# Patient Record
Sex: FEMALE | Race: WHITE | NOT HISPANIC OR LATINO | URBAN - METROPOLITAN AREA
[De-identification: names, ages, dates, MRNs, and addresses within clinical notes are randomized per-mention and may not be internally consistent; named-entity substitution may affect disease eponyms.]

---

## 2021-12-01 ENCOUNTER — EMERGENCY (EMERGENCY)
Facility: HOSPITAL | Age: 71
LOS: 1 days | End: 2021-12-01
Admitting: EMERGENCY MEDICINE
Payer: MEDICARE

## 2021-12-01 PROCEDURE — 99284 EMERGENCY DEPT VISIT MOD MDM: CPT

## 2021-12-01 PROCEDURE — 71045 X-RAY EXAM CHEST 1 VIEW: CPT | Mod: 26

## 2021-12-01 PROCEDURE — 93010 ELECTROCARDIOGRAM REPORT: CPT

## 2022-03-21 ENCOUNTER — APPOINTMENT (OUTPATIENT)
Dept: CT IMAGING | Facility: CLINIC | Age: 72
End: 2022-03-21
Payer: MEDICARE

## 2022-03-21 PROCEDURE — 71250 CT THORAX DX C-: CPT

## 2022-04-14 PROBLEM — Z00.00 ENCOUNTER FOR PREVENTIVE HEALTH EXAMINATION: Status: ACTIVE | Noted: 2022-04-14

## 2022-05-09 ENCOUNTER — OUTPATIENT (OUTPATIENT)
Dept: OUTPATIENT SERVICES | Facility: HOSPITAL | Age: 72
LOS: 1 days | End: 2022-05-09
Payer: MEDICARE

## 2022-05-09 DIAGNOSIS — R91.1 SOLITARY PULMONARY NODULE: ICD-10-CM

## 2022-05-16 ENCOUNTER — NON-APPOINTMENT (OUTPATIENT)
Age: 72
End: 2022-05-16

## 2022-05-16 ENCOUNTER — RESULT REVIEW (OUTPATIENT)
Age: 72
End: 2022-05-16

## 2022-05-16 ENCOUNTER — APPOINTMENT (OUTPATIENT)
Dept: HEMATOLOGY ONCOLOGY | Facility: CLINIC | Age: 72
End: 2022-05-16
Payer: MEDICARE

## 2022-05-16 VITALS
WEIGHT: 194.6 LBS | OXYGEN SATURATION: 97 % | HEIGHT: 65 IN | DIASTOLIC BLOOD PRESSURE: 82 MMHG | SYSTOLIC BLOOD PRESSURE: 134 MMHG | BODY MASS INDEX: 32.42 KG/M2 | HEART RATE: 83 BPM | TEMPERATURE: 97.9 F

## 2022-05-16 LAB
ALBUMIN SERPL ELPH-MCNC: 4.6 G/DL
ALP BLD-CCNC: 57 U/L
ALT SERPL-CCNC: 33 U/L
ANION GAP SERPL CALC-SCNC: 17 MMOL/L
APTT BLD: 34.5 SEC
AST SERPL-CCNC: 37 U/L
BASOPHILS # BLD AUTO: 0.1 K/UL — SIGNIFICANT CHANGE UP (ref 0–0.2)
BASOPHILS NFR BLD AUTO: 1 % — SIGNIFICANT CHANGE UP (ref 0–2)
BILIRUB SERPL-MCNC: 0.2 MG/DL
BUN SERPL-MCNC: 17 MG/DL
CALCIUM SERPL-MCNC: 10.4 MG/DL
CEA SERPL-MCNC: 1 NG/ML
CHLORIDE SERPL-SCNC: 103 MMOL/L
CO2 SERPL-SCNC: 19 MMOL/L
CREAT SERPL-MCNC: 0.64 MG/DL
EGFR: 93 ML/MIN/1.73M2
EOSINOPHIL # BLD AUTO: 0.29 K/UL — SIGNIFICANT CHANGE UP (ref 0–0.5)
EOSINOPHIL NFR BLD AUTO: 3 % — SIGNIFICANT CHANGE UP (ref 0–6)
FERRITIN SERPL-MCNC: 61 NG/ML
FOLATE SERPL-MCNC: >20 NG/ML
GLUCOSE SERPL-MCNC: 143 MG/DL
HCT VFR BLD CALC: 38.7 % — SIGNIFICANT CHANGE UP (ref 34.5–45)
HGB BLD-MCNC: 12.6 G/DL — SIGNIFICANT CHANGE UP (ref 11.5–15.5)
IMM GRANULOCYTES NFR BLD AUTO: 0.3 % — SIGNIFICANT CHANGE UP (ref 0–1.5)
INR PPP: 1.08 RATIO
IRON SATN MFR SERPL: 8 %
IRON SERPL-MCNC: 41 UG/DL
LYMPHOCYTES # BLD AUTO: 2.74 K/UL — SIGNIFICANT CHANGE UP (ref 1–3.3)
LYMPHOCYTES # BLD AUTO: 28.2 % — SIGNIFICANT CHANGE UP (ref 13–44)
MCHC RBC-ENTMCNC: 27.7 PG — SIGNIFICANT CHANGE UP (ref 27–34)
MCHC RBC-ENTMCNC: 32.6 GM/DL — SIGNIFICANT CHANGE UP (ref 32–36)
MCV RBC AUTO: 85.1 FL — SIGNIFICANT CHANGE UP (ref 80–100)
MONOCYTES # BLD AUTO: 0.45 K/UL — SIGNIFICANT CHANGE UP (ref 0–0.9)
MONOCYTES NFR BLD AUTO: 4.6 % — SIGNIFICANT CHANGE UP (ref 2–14)
NEUTROPHILS # BLD AUTO: 6.09 K/UL — SIGNIFICANT CHANGE UP (ref 1.8–7.4)
NEUTROPHILS NFR BLD AUTO: 62.9 % — SIGNIFICANT CHANGE UP (ref 43–77)
NRBC # BLD: 0 /100 WBCS — SIGNIFICANT CHANGE UP (ref 0–0)
PLATELET # BLD AUTO: 400 K/UL — SIGNIFICANT CHANGE UP (ref 150–400)
POTASSIUM SERPL-SCNC: 4.5 MMOL/L
PROT SERPL-MCNC: 7.6 G/DL
PT BLD: 12.7 SEC
RBC # BLD: 4.55 M/UL — SIGNIFICANT CHANGE UP (ref 3.8–5.2)
RBC # FLD: 14.2 % — SIGNIFICANT CHANGE UP (ref 10.3–14.5)
SODIUM SERPL-SCNC: 139 MMOL/L
TIBC SERPL-MCNC: 496 UG/DL
UIBC SERPL-MCNC: 455 UG/DL
VIT B12 SERPL-MCNC: 1062 PG/ML
WBC # BLD: 9.7 K/UL — SIGNIFICANT CHANGE UP (ref 3.8–10.5)
WBC # FLD AUTO: 9.7 K/UL — SIGNIFICANT CHANGE UP (ref 3.8–10.5)

## 2022-05-16 PROCEDURE — 85027 COMPLETE CBC AUTOMATED: CPT

## 2022-05-16 PROCEDURE — 36415 COLL VENOUS BLD VENIPUNCTURE: CPT

## 2022-05-16 PROCEDURE — 99205 OFFICE O/P NEW HI 60 MIN: CPT

## 2022-05-16 RX ORDER — FENOFIBRATE 54 MG/1
54 TABLET ORAL DAILY
Refills: 0 | Status: ACTIVE | COMMUNITY

## 2022-05-16 RX ORDER — SIMVASTATIN 20 MG/1
20 TABLET, FILM COATED ORAL DAILY
Refills: 0 | Status: ACTIVE | COMMUNITY

## 2022-05-16 RX ORDER — METFORMIN HYDROCHLORIDE 1000 MG/1
1000 TABLET, COATED ORAL
Refills: 0 | Status: ACTIVE | COMMUNITY

## 2022-05-16 RX ORDER — ASPIRIN 81 MG
81 TABLET, DELAYED RELEASE (ENTERIC COATED) ORAL DAILY
Refills: 0 | Status: ACTIVE | COMMUNITY

## 2022-05-16 RX ORDER — VALSARTAN 160 MG/1
160 TABLET, COATED ORAL DAILY
Refills: 0 | Status: ACTIVE | COMMUNITY

## 2022-05-16 NOTE — CONSULT LETTER
[Dear  ___] : Dear  [unfilled], [Consult Letter:] : I had the pleasure of evaluating your patient, [unfilled]. [Please see my note below.] : Please see my note below. [Consult Closing:] : Thank you very much for allowing me to participate in the care of this patient.  If you have any questions, please do not hesitate to contact me. [Sincerely,] : Sincerely, [FreeTextEntry2] : Dr. Marissa Byrne [FreeTextEntry3] : Dr. Tigist Kelly\par

## 2022-05-16 NOTE — PHYSICAL EXAM
[Restricted in physically strenuous activity but ambulatory and able to carry out work of a light or sedentary nature] : Status 1- Restricted in physically strenuous activity but ambulatory and able to carry out work of a light or sedentary nature, e.g., light house work, office work [Obese] : obese [Normal] : affect appropriate [de-identified] : generally well appearing female, NAD, pleasant  [de-identified] : lungs are clear bilaterally  [de-identified] : s/p bilateral breast reduction and R lumpectomy, post surgical changes, no palpable lymphadenopathy or masses

## 2022-05-16 NOTE — RESULTS/DATA
[FreeTextEntry1] : Ms. Rios presented at age 71 in May 2022 for evaluation of pulmonary nodules. 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 \par \par Will proceed with workup of R apex lesion, follow up PET/CT.

## 2022-05-16 NOTE — HISTORY OF PRESENT ILLNESS
[de-identified] : Referred by: Dr. Marissa Byrne\par Diagnosis: pulmonary nodule\par \par Ms. Rios presented at age 71 in May 2022 for evaluation of pulmonary nodules. 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 \par \par Meagan was sent in for evaluation by her pulmonologist for evaluation of an opacity in the right lung.  She has a history of chronic shortness of breath with exertion. She underwent CT chest 12/2/21 which was negative for PE, revealed groundglass opacity in the R lung apex, as well as adjacent 4mm nodule. Cannot exclude neoplastic process. She then underwent repeat CT chest on 3/21/22 which revealed a 3 x 1.7cm indeterminate opacity within the R apex, scattered subCM nodules, possibly benign intrapulmonary lymph nodes- recommended 3 month follow up.  She is pending a PET/CT at the end of May which was ordered by Dr. Byrne.\par \par Of note she has a history of right breast cancer diagnosed in 2003 and is status post right lumpectomy, radiation and chemotherapy.  She believes her tumor was hormone positive and she completed about 7 years of antiestrogen therapy which she states she took every other day due to issues with her bone density.  She was previously following with medical oncologist Dr. Asencio in [facility] but states she needs a new oncologist since she moved out here about 2 years ago during the pandemic.  She is a never smoker.\par \par At this time she reports that she is generally feeling well and has a good appetite and energy level.  She denies chest pain, fevers, nausea, vomiting, diarrhea, lower extremity swelling.\par \par Imaging: \par CT chest 12/2/21- negative for PE, revealed groundglass opacity in the R lung apex, as well as adjacent 4mm nodule. Cannot exclude neoplastic process. \par CT chest 3/21/22- 3 x 1.7cm indeterminate opacity within the R apex, scattered subCM nodules, possibly benign intrapulmonary lymph nodes- recommended 3 month follow up\par \par Path: none\par \par Genetics: none\par \par HCM: \par - COVID vaccination: s/p 3 doses \par - Colonoscopy: last done 2014, next due 2024\par - Gyn: recently seen by Dr. Doherty, no longer screening with pap \par - Mammo: 4/22/22 BIRADS 2 \par - DEXA 4/22/22 Osteopenia, currently taking vitamin D \par \par SH: \par - Occupation: worked as an  at [employer] in [place] for 47 years\par - Living situation: lives in [place], with her daughter, grandson and nephew\par - Smoking/etoh/illicits: never smoker, rare etoh, denies illicits \par - Exercise: she walks her dog but otherwise is not very active \par \par FH: \par - No family history of cancer

## 2022-05-17 LAB — CANCER AG27-29 SERPL-ACNC: 7.7 U/ML

## 2022-05-25 ENCOUNTER — APPOINTMENT (OUTPATIENT)
Dept: NUCLEAR MEDICINE | Facility: CLINIC | Age: 72
End: 2022-05-25

## 2022-05-25 ENCOUNTER — APPOINTMENT (OUTPATIENT)
Dept: NUCLEAR MEDICINE | Facility: CLINIC | Age: 72
End: 2022-05-25
Payer: MEDICARE

## 2022-05-25 PROCEDURE — A9552: CPT

## 2022-05-25 PROCEDURE — 78815 PET IMAGE W/CT SKULL-THIGH: CPT | Mod: PI

## 2022-06-29 ENCOUNTER — OUTPATIENT (OUTPATIENT)
Dept: OUTPATIENT SERVICES | Facility: HOSPITAL | Age: 72
LOS: 1 days | End: 2022-06-29

## 2022-06-29 ENCOUNTER — APPOINTMENT (OUTPATIENT)
Dept: HEMATOLOGY ONCOLOGY | Facility: CLINIC | Age: 72
End: 2022-06-29

## 2022-06-29 VITALS
HEART RATE: 69 BPM | SYSTOLIC BLOOD PRESSURE: 132 MMHG | DIASTOLIC BLOOD PRESSURE: 80 MMHG | WEIGHT: 194 LBS | BODY MASS INDEX: 32.28 KG/M2 | OXYGEN SATURATION: 97 % | TEMPERATURE: 98.1 F

## 2022-06-29 DIAGNOSIS — Z85.3 PERSONAL HISTORY OF MALIGNANT NEOPLASM OF BREAST: ICD-10-CM

## 2022-06-29 DIAGNOSIS — R91.1 SOLITARY PULMONARY NODULE: ICD-10-CM

## 2022-06-29 DIAGNOSIS — D64.9 ANEMIA, UNSPECIFIED: ICD-10-CM

## 2022-06-29 PROCEDURE — 99214 OFFICE O/P EST MOD 30 MIN: CPT

## 2022-06-29 NOTE — PHYSICAL EXAM
[Restricted in physically strenuous activity but ambulatory and able to carry out work of a light or sedentary nature] : Status 1- Restricted in physically strenuous activity but ambulatory and able to carry out work of a light or sedentary nature, e.g., light house work, office work [Obese] : obese [Normal] : affect appropriate [de-identified] : generally well appearing female, NAD, pleasant  [de-identified] : scattered crackles in b/l lungs, occasional wheezing [de-identified] : s/p bilateral breast reduction and R lumpectomy, post surgical changes, no palpable lymphadenopathy or masses

## 2022-06-29 NOTE — HISTORY OF PRESENT ILLNESS
[de-identified] : Referred by: Dr. Marissa Byrne\par Diagnosis: pulmonary nodule\par \par Ms. Rios presented at age 71 in May 2022 for evaluation of pulmonary nodules. 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 \par \par Presenting HPI: Meagan was sent in for evaluation by her pulmonologist for evaluation of an opacity in the right lung.  She has a history of chronic shortness of breath with exertion. She underwent CT chest 12/2/21 which was negative for PE, revealed groundglass opacity in the R lung apex, as well as adjacent 4mm nodule. Cannot exclude neoplastic process. She then underwent repeat CT chest on 3/21/22 which revealed a 3 x 1.7cm indeterminate opacity within the R apex, scattered subCM nodules, possibly benign intrapulmonary lymph nodes- recommended 3 month follow up.  She is pending a PET/CT at the end of May which was ordered by Dr. Byrne.\par \par Of note she has a history of right breast cancer diagnosed in 2003 and is status post right lumpectomy, radiation and chemotherapy.  She believes her tumor was hormone positive and she completed about 7 years of antiestrogen therapy which she states she took every other day due to issues with her bone density.  She was previously following with medical oncologist Dr. Asencio in [facility] but states she needs a new oncologist since she moved out here about 2 years ago during the pandemic.  She is a never smoker.\par \par At this time she reports that she is generally feeling well and has a good appetite and energy level.  She denies chest pain, fevers, nausea, vomiting, diarrhea, lower extremity swelling.\par \par Imaging: \par CT chest 12/2/21- negative for PE, revealed groundglass opacity in the R lung apex, as well as adjacent 4mm nodule. Cannot exclude neoplastic process. \par CT chest 3/21/22- 3 x 1.7cm indeterminate opacity within the R apex, scattered subCM nodules, possibly benign intrapulmonary lymph nodes- recommended 3 month follow up\par \par Path: none\par \par Genetics: none\par \par HCM: \par - COVID vaccination: s/p 3 doses \par - Colonoscopy: last done 2014, next due 2024\par - Gyn: recently seen by Dr. Doherty, no longer screening with pap \par - Mammo: 4/22/22 BIRADS 2 \par - DEXA 4/22/22 Osteopenia, currently taking vitamin D \par \par SH: \par - Occupation: worked as an  at [employer] in [place] for 47 years\par - Living situation: lives in [place], with her daughter, grandson and nephew\par - Smoking/etoh/illicits: never smoker, rare etoh, denies illicits \par - Exercise: she walks her dog but otherwise is not very active \par \par FH: \par - No family history of cancer  [de-identified] : Meagan presents for follow up on 6/29/22 for lung nodule, hx of breast cancer. \par \par Labs reviewed- unremarkable except for elevated TIBC and low Tsat. Tumor markers normal. \par \par PET/CT 5/25/22 reviewed: indeterminate, irregular opacity in the RUL, favor inflammatory/infectious, recommend f/u CT in 3 months. Mild hepatomegaly with mild prominence of the umbilical vein, correlate for possible cirrhosis. Mild dilatation of ascending aorta, stable. \par \par She was seen by Dr. Byrne who recommended repeat imaging in 3 months. She reports a normal energy level and appetite. Denies blood in her urine or stools. Has SOB only with stairs. She has a chronic cough. Denies fevers. \par \par HCM: \par - DEXA 4/22/22: osteopenia, taking calcium supplement \par - Breast US 4/22: BIRADS 2, repeat in 1 year \par - Last colonoscopy 2017, was told to repeat in 10 years \par - Gyn: no longer screening with pap smear

## 2022-06-29 NOTE — RESULTS/DATA
[FreeTextEntry1] : Ms. Rios presented at age 71 in May 2022 for evaluation of pulmonary nodules. 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 \par \par PET/CT indeterminate for pulmonary lesion- repeat in 3 months. \par Start PO iron supplement. \par RTC mid-september to review PET/CT.

## 2022-12-13 ENCOUNTER — OUTPATIENT (OUTPATIENT)
Dept: OUTPATIENT SERVICES | Facility: HOSPITAL | Age: 72
LOS: 1 days | End: 2022-12-13

## 2022-12-13 DIAGNOSIS — R91.1 SOLITARY PULMONARY NODULE: ICD-10-CM

## 2022-12-18 PROBLEM — D50.9 ANEMIA, IRON DEFICIENCY: Status: ACTIVE | Noted: 2022-06-29

## 2022-12-18 NOTE — HISTORY OF PRESENT ILLNESS
[de-identified] : Referred by: Dr. Marissa Byrne\par Diagnosis: pulmonary nodule\par \par Ms. Rios presented at age 71 in May 2022 for evaluation of pulmonary nodules. 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 \par \par Presenting HPI: Meagan was sent in for evaluation by her pulmonologist for evaluation of an opacity in the right lung.  She has a history of chronic shortness of breath with exertion. She underwent CT chest 12/2/21 which was negative for PE, revealed groundglass opacity in the R lung apex, as well as adjacent 4mm nodule. Cannot exclude neoplastic process. She then underwent repeat CT chest on 3/21/22 which revealed a 3 x 1.7cm indeterminate opacity within the R apex, scattered subCM nodules, possibly benign intrapulmonary lymph nodes- recommended 3 month follow up.  She is pending a PET/CT at the end of May which was ordered by Dr. Byrne.\par \par Of note she has a history of right breast cancer diagnosed in 2003 and is status post right lumpectomy, radiation and chemotherapy.  She believes her tumor was hormone positive and she completed about 7 years of antiestrogen therapy which she states she took every other day due to issues with her bone density.  She was previously following with medical oncologist Dr. Asencio in [facility] but states she needs a new oncologist since she moved out here about 2 years ago during the pandemic.  She is a never smoker.\par \par At this time she reports that she is generally feeling well and has a good appetite and energy level.  She denies chest pain, fevers, nausea, vomiting, diarrhea, lower extremity swelling.\par \par Imaging: \par CT chest 12/2/21- negative for PE, revealed groundglass opacity in the R lung apex, as well as adjacent 4mm nodule. Cannot exclude neoplastic process. \par CT chest 3/21/22- 3 x 1.7cm indeterminate opacity within the R apex, scattered subCM nodules, possibly benign intrapulmonary lymph nodes- recommended 3 month follow up\par \par Path: none\par \par Genetics: none\par \par HCM: \par - COVID vaccination: s/p 3 doses \par - Colonoscopy: last done 2014, next due 2024\par - Gyn: recently seen by Dr. Doherty, no longer screening with pap \par - Mammo: 4/22/22 BIRADS 2 \par - DEXA 4/22/22 Osteopenia, currently taking vitamin D \par \par SH: \par - Occupation: worked as an  at [employer] in [place] for 47 years\par - Living situation: lives in [place], with her daughter, grandson and nephew\par - Smoking/etoh/illicits: never smoker, rare etoh, denies illicits \par - Exercise: she walks her dog but otherwise is not very active \par \par FH: \par - No family history of cancer  [de-identified] : Meagan presents for follow up on 12/19/22 for lung nodule, hx of breast cancer. \par \par Labs reviewed- unremarkable except for elevated TIBC and low Tsat. Tumor markers normal. \par \par PET/CT 5/25/22 reviewed: indeterminate, irregular opacity in the RUL, favor inflammatory/infectious, recommend f/u CT in 3 months. Mild hepatomegaly with mild prominence of the umbilical vein, correlate for possible cirrhosis. Mild dilatation of ascending aorta, stable. \par \par She was seen by Dr. Byrne who recommended repeat imaging in 3 months. She reports a normal energy level and appetite. Denies blood in her urine or stools. Has SOB only with stairs. She has a chronic cough. Denies fevers. \par \par HCM: \par - DEXA 4/22/22: osteopenia, taking calcium supplement \par - Breast US 4/22: BIRADS 2, repeat in 1 year \par - Last colonoscopy 2017, was told to repeat in 10 years \par - Gyn: no longer screening with pap smear \par \par *** did she ever get repeat CT scan?

## 2022-12-19 ENCOUNTER — APPOINTMENT (OUTPATIENT)
Dept: HEMATOLOGY ONCOLOGY | Facility: CLINIC | Age: 72
End: 2022-12-19

## 2022-12-19 DIAGNOSIS — D50.9 IRON DEFICIENCY ANEMIA, UNSPECIFIED: ICD-10-CM

## 2022-12-26 ENCOUNTER — APPOINTMENT (OUTPATIENT)
Dept: CT IMAGING | Facility: CLINIC | Age: 72
End: 2022-12-26
Payer: MEDICARE

## 2022-12-26 PROCEDURE — 71250 CT THORAX DX C-: CPT

## 2023-05-15 ENCOUNTER — APPOINTMENT (OUTPATIENT)
Dept: MAMMOGRAPHY | Facility: CLINIC | Age: 73
End: 2023-05-15
Payer: MEDICARE

## 2023-05-15 ENCOUNTER — APPOINTMENT (OUTPATIENT)
Dept: ULTRASOUND IMAGING | Facility: CLINIC | Age: 73
End: 2023-05-15

## 2023-05-15 PROCEDURE — 77063 BREAST TOMOSYNTHESIS BI: CPT

## 2023-05-15 PROCEDURE — 76705 ECHO EXAM OF ABDOMEN: CPT

## 2023-05-15 PROCEDURE — 77067 SCR MAMMO BI INCL CAD: CPT

## 2023-05-15 PROCEDURE — 76641 ULTRASOUND BREAST COMPLETE: CPT | Mod: 50

## 2023-06-24 ENCOUNTER — EMERGENCY (EMERGENCY)
Facility: HOSPITAL | Age: 73
LOS: 1 days | Discharge: HOME | End: 2023-06-24
Attending: EMERGENCY MEDICINE
Payer: MEDICARE

## 2023-06-24 VITALS
HEIGHT: 66 IN | DIASTOLIC BLOOD PRESSURE: 71 MMHG | HEART RATE: 72 BPM | WEIGHT: 169.98 LBS | RESPIRATION RATE: 19 BRPM | OXYGEN SATURATION: 94 % | SYSTOLIC BLOOD PRESSURE: 109 MMHG | TEMPERATURE: 99 F

## 2023-06-24 VITALS
RESPIRATION RATE: 18 BRPM | TEMPERATURE: 98 F | HEART RATE: 83 BPM | SYSTOLIC BLOOD PRESSURE: 119 MMHG | DIASTOLIC BLOOD PRESSURE: 72 MMHG | OXYGEN SATURATION: 96 %

## 2023-06-24 LAB
ALBUMIN SERPL ELPH-MCNC: 4 G/DL — SIGNIFICANT CHANGE UP (ref 3.3–5.2)
ALP SERPL-CCNC: 50 U/L — SIGNIFICANT CHANGE UP (ref 40–120)
ALT FLD-CCNC: 28 U/L — SIGNIFICANT CHANGE UP
ANION GAP SERPL CALC-SCNC: 15 MMOL/L — SIGNIFICANT CHANGE UP (ref 5–17)
AST SERPL-CCNC: 41 U/L — HIGH
BASOPHILS # BLD AUTO: 0.09 K/UL — SIGNIFICANT CHANGE UP (ref 0–0.2)
BASOPHILS NFR BLD AUTO: 0.7 % — SIGNIFICANT CHANGE UP (ref 0–2)
BILIRUB SERPL-MCNC: 0.5 MG/DL — SIGNIFICANT CHANGE UP (ref 0.4–2)
BUN SERPL-MCNC: 28.2 MG/DL — HIGH (ref 8–20)
CALCIUM SERPL-MCNC: 9.4 MG/DL — SIGNIFICANT CHANGE UP (ref 8.4–10.5)
CHLORIDE SERPL-SCNC: 101 MMOL/L — SIGNIFICANT CHANGE UP (ref 96–108)
CO2 SERPL-SCNC: 21 MMOL/L — LOW (ref 22–29)
CREAT SERPL-MCNC: 1.03 MG/DL — SIGNIFICANT CHANGE UP (ref 0.5–1.3)
EGFR: 57 ML/MIN/1.73M2 — LOW
EOSINOPHIL # BLD AUTO: 0.35 K/UL — SIGNIFICANT CHANGE UP (ref 0–0.5)
EOSINOPHIL NFR BLD AUTO: 2.7 % — SIGNIFICANT CHANGE UP (ref 0–6)
GLUCOSE SERPL-MCNC: 181 MG/DL — HIGH (ref 70–99)
HCT VFR BLD CALC: 36.1 % — SIGNIFICANT CHANGE UP (ref 34.5–45)
HGB BLD-MCNC: 11.7 G/DL — SIGNIFICANT CHANGE UP (ref 11.5–15.5)
IMM GRANULOCYTES NFR BLD AUTO: 0.5 % — SIGNIFICANT CHANGE UP (ref 0–0.9)
LYMPHOCYTES # BLD AUTO: 2.6 K/UL — SIGNIFICANT CHANGE UP (ref 1–3.3)
LYMPHOCYTES # BLD AUTO: 20 % — SIGNIFICANT CHANGE UP (ref 13–44)
MAGNESIUM SERPL-MCNC: 1.5 MG/DL — LOW (ref 1.8–2.6)
MCHC RBC-ENTMCNC: 28.3 PG — SIGNIFICANT CHANGE UP (ref 27–34)
MCHC RBC-ENTMCNC: 32.4 GM/DL — SIGNIFICANT CHANGE UP (ref 32–36)
MCV RBC AUTO: 87.4 FL — SIGNIFICANT CHANGE UP (ref 80–100)
MONOCYTES # BLD AUTO: 0.61 K/UL — SIGNIFICANT CHANGE UP (ref 0–0.9)
MONOCYTES NFR BLD AUTO: 4.7 % — SIGNIFICANT CHANGE UP (ref 2–14)
NEUTROPHILS # BLD AUTO: 9.31 K/UL — HIGH (ref 1.8–7.4)
NEUTROPHILS NFR BLD AUTO: 71.4 % — SIGNIFICANT CHANGE UP (ref 43–77)
PLATELET # BLD AUTO: 234 K/UL — SIGNIFICANT CHANGE UP (ref 150–400)
POTASSIUM SERPL-MCNC: 5 MMOL/L — SIGNIFICANT CHANGE UP (ref 3.5–5.3)
POTASSIUM SERPL-SCNC: 5 MMOL/L — SIGNIFICANT CHANGE UP (ref 3.5–5.3)
PROT SERPL-MCNC: 6.8 G/DL — SIGNIFICANT CHANGE UP (ref 6.6–8.7)
RBC # BLD: 4.13 M/UL — SIGNIFICANT CHANGE UP (ref 3.8–5.2)
RBC # FLD: 14.6 % — HIGH (ref 10.3–14.5)
SODIUM SERPL-SCNC: 137 MMOL/L — SIGNIFICANT CHANGE UP (ref 135–145)
WBC # BLD: 13.02 K/UL — HIGH (ref 3.8–10.5)
WBC # FLD AUTO: 13.02 K/UL — HIGH (ref 3.8–10.5)

## 2023-06-24 PROCEDURE — 99284 EMERGENCY DEPT VISIT MOD MDM: CPT

## 2023-06-24 PROCEDURE — 93010 ELECTROCARDIOGRAM REPORT: CPT

## 2023-06-24 RX ORDER — SODIUM CHLORIDE 9 MG/ML
1000 INJECTION INTRAMUSCULAR; INTRAVENOUS; SUBCUTANEOUS ONCE
Refills: 0 | Status: COMPLETED | OUTPATIENT
Start: 2023-06-24 | End: 2023-06-24

## 2023-06-24 RX ADMIN — SODIUM CHLORIDE 1000 MILLILITER(S): 9 INJECTION INTRAMUSCULAR; INTRAVENOUS; SUBCUTANEOUS at 21:02

## 2023-06-24 NOTE — ED PROVIDER NOTE - CLINICAL SUMMARY MEDICAL DECISION MAKING FREE TEXT BOX
73 y/o F hx of DM, HLD, "fatty liver" presents for episode of near syncope. states she had 1 alcoholic drink and ate shrimp w/ dip and aprox 1 hour later began to feel lightheaded.   well appearing female, denies any chest pain, denies any pain in general. endorsing improved symptoms s/p IVF. no LOC. denies falls or striking head. stable vitals on arrival.   no signs of acute ischemia/arrhythmia on EKG, no stemi. will check lytes, hemoglobin. fluid bolus and reassess.

## 2023-06-24 NOTE — ED PROVIDER NOTE - NSFOLLOWUPINSTRUCTIONS_ED_ALL_ED_FT
Near-Syncope  Outline of the head showing blood vessels that supply the brain.   Near-syncope is when you suddenly feel like you might pass out or faint. This may also be called presyncope. During an episode of near-syncope, you may:  Feel dizzy, weak, or light-headed. It may feel like the room is spinning.  Feel like you may vomit (nauseous).  See spots or see all white or all black.  Have cold, clammy skin.  Feel warm and sweaty.  Hear ringing in your ears.  This condition is caused by a sudden decrease in blood flow to the brain. This can result from many causes, but most of those causes are not dangerous. However, near-syncope may be a sign of a serious medical problem, so it is important to seek medical care.    Follow these instructions at home:  Medicines    Take over-the-counter and prescription medicines only as told by your doctor.  If you are taking blood pressure or heart medicine, get up slowly and spend many minutes getting ready to sit and then stand. This can help with dizziness.  Lifestyle    Do not drive, use machinery, or play sports until your doctor says it is okay.  Do not drink alcohol.  Do not smoke or use any products that contain nicotine or tobacco. If you need help quitting, ask your doctor.  Avoid hot tubs and saunas.  General instructions    Be aware of any changes in your symptoms.  Talk with your doctor about your symptoms. You may need to have testing to help find the cause.  If you start to feel like you might pass out, sit or lie down right away. If sitting, lower your head down between your legs. If lying down, raise (elevate) your feet above the level of your heart.  Breathe deeply and steadily. Wait until all of the symptoms are gone.  Have someone stay with you until you feel better.  Drink enough fluid to keep your pee (urine) pale yellow.  Avoid standing for a long time. If you must stand for a long time, do movements such as:  Moving your legs.  Crossing your legs.  Flexing and stretching your leg muscles.  Squatting.  Keep all follow-up visits.  Contact a doctor if:  You continue to have episodes of near fainting.  Get help right away if:  You pass out or faint.  You have any of these symptoms:  Fast or uneven heartbeats (palpitations).  Pain in your chest, belly, or back.  Shortness of breath.  You have a seizure.  You have a very bad headache.  You are confused.  You have trouble seeing.  You are very weak.  You have trouble walking.  You are bleeding from your mouth or butt.  You have black or tarry poop (stool).  These symptoms may be an emergency. Get help right away. Call your local emergency services (911 in the [place]  Do not wait to see if the symptoms will go away.  Do not drive yourself to the hospital.  Summary  Near-syncope is when you suddenly feel like you might pass out or faint.  This condition is caused by a sudden decrease in blood flow to the brain.  Near-syncope may be a sign of a serious medical problem, so it is important to seek medical care.  If you start to feel like you might pass out, sit or lie down right away. If sitting, lower your head down between your legs. If lying down, raise (elevate) your feet above the level of your heart.  Talk with your doctor about your symptoms. You may need to have testing to help find the cause.  This information is not intended to replace advice given to you by your health care provider. Make sure you discuss any questions you have with your health care provider.

## 2023-06-24 NOTE — ED ADULT TRIAGE NOTE - CHIEF COMPLAINT QUOTE
pt c/o near syncope episode at an event where patient stated feeling lightheaded, went to bathroom for a bowel movement and after looked faint in front of EMS. no Syncope but was hypotensive originally but responding with IVF.

## 2023-06-24 NOTE — ED PROVIDER NOTE - ATTENDING CONTRIBUTION TO CARE
I personally saw the patient with the resident, and completed the key components of the history and physical exam. I then discussed the management plan with the resident.      72 female with past medical history of diabetes, hyperlipidemia presents with an episode of near syncope after drinking 1 alcoholic drink and eating shrimp with dip about 1 hour prior to arrival.  She denies any chest pain, nausea, vomiting, shortness of breath.  Otherwise feeling well.    I agree with exam as documented.    EKG within normal limits, no ischemia, labs remarkable for mildly low magnesium, patient hydrated, feels better.  Comfortable with discharge home.  Outpatient follow-up.  Strict return precautions given.

## 2023-06-24 NOTE — ED PROVIDER NOTE - OBJECTIVE STATEMENT
71 y/o F hx of DM, HLD, "fatty liver" presents for episode of near syncope. states she had 1 alcoholic drink and ate shrimp w/ dip and aprox 1 hour later began to feel lightheaded. endorses not eating or drinking anything throughout the day. endorsing feeling better now, received IVF. denies any chest pain. denies shortness of breath. denies abdominal pain. Denies dysuria, increased urinary frequency. Denies recent illness, fever or chills.

## 2023-06-24 NOTE — ED ADULT NURSE NOTE - NSFALLUNIVINTERV_ED_ALL_ED
Bed/Stretcher in lowest position, wheels locked, appropriate side rails in place/Call bell, personal items and telephone in reach/Instruct patient to call for assistance before getting out of bed/chair/stretcher/Non-slip footwear applied when patient is off stretcher/[place] to call system/Physically safe environment - no spills, clutter or unnecessary equipment/Purposeful proactive rounding/Room/bathroom lighting operational, light cord in reach

## 2023-06-24 NOTE — ED PROVIDER NOTE - PATIENT PORTAL LINK FT
You can access the FollowMyHealth Patient Portal offered by [facility] by registering at the following website: [email or web address]. By joining [employer]’s FollowMyHealth portal, you will also be able to view your health information using other applications (apps) compatible with our system.

## 2023-06-24 NOTE — ED ADULT NURSE NOTE - OBJECTIVE STATEMENT
PT A&OX4. PT stated that she was feeling weak and lightheaded.  PT noted to have diarrhea.  PT noted to have some nausea no vomiting.  Pt noted to be soiled, all bed care rendered.  Will continue to monitor.

## 2023-06-24 NOTE — ED PROVIDER NOTE - PHYSICAL EXAMINATION
General: Well appearing female in no acute distress  HEENT: Normocephalic, atraumatic. Moist mucous membranes. Oropharynx clear. No lymphadenopathy.  Eyes: No scleral icterus. EOMI. APRIL.  Neck:. Soft and supple. Full ROM without pain. No midline tenderness  Cardiac: Regular rate and regular rhythm. No murmurs, rubs, gallops. Peripheral pulses 2+ and symmetric. No LE edema.  Resp: Lungs CTAB. Speaking in full sentences. No wheezes, rales or rhonchi.  Abd: Soft, non-tender, non-distended. No guarding or rebound. No scars, masses, or lesions.  Back: Spine midline and non-tender. No CVA tenderness.    Skin: No rashes, abrasions, or lacerations.  Neuro: AO x 3. Moves all extremities symmetrically. Motor strength and sensation grossly intact.

## 2023-06-24 NOTE — ED PROVIDER NOTE - PROGRESS NOTE DETAILS
Solitario: Patient reassessed, results shared.  She states she feels much better.  Patient had large bowel movement.  Abdomen remains soft, nontender.  Daughter will take patient home.  All questions answered.  Patient and daughter comfortable with discharge home.

## 2023-06-25 PROCEDURE — 93005 ELECTROCARDIOGRAM TRACING: CPT

## 2023-06-25 PROCEDURE — 80053 COMPREHEN METABOLIC PANEL: CPT

## 2023-06-25 PROCEDURE — 82962 GLUCOSE BLOOD TEST: CPT

## 2023-06-25 PROCEDURE — 99283 EMERGENCY DEPT VISIT LOW MDM: CPT | Mod: 25

## 2023-06-25 PROCEDURE — 36415 COLL VENOUS BLD VENIPUNCTURE: CPT

## 2023-06-25 PROCEDURE — 83735 ASSAY OF MAGNESIUM: CPT

## 2023-06-25 PROCEDURE — 85025 COMPLETE CBC W/AUTO DIFF WBC: CPT

## 2023-07-10 ENCOUNTER — APPOINTMENT (OUTPATIENT)
Dept: ULTRASOUND IMAGING | Facility: CLINIC | Age: 73
End: 2023-07-10
Payer: MEDICARE

## 2023-07-10 ENCOUNTER — APPOINTMENT (OUTPATIENT)
Dept: CT IMAGING | Facility: CLINIC | Age: 73
End: 2023-07-10
Payer: MEDICARE

## 2023-07-10 PROCEDURE — 93880 EXTRACRANIAL BILAT STUDY: CPT

## 2023-07-10 PROCEDURE — 71250 CT THORAX DX C-: CPT

## 2023-07-24 ENCOUNTER — APPOINTMENT (OUTPATIENT)
Dept: ULTRASOUND IMAGING | Facility: CLINIC | Age: 73
End: 2023-07-24
Payer: MEDICARE

## 2023-07-24 PROCEDURE — 76700 US EXAM ABDOM COMPLETE: CPT

## 2024-01-12 ENCOUNTER — RESULT REVIEW (OUTPATIENT)
Age: 74
End: 2024-01-12

## 2024-03-06 ENCOUNTER — APPOINTMENT (OUTPATIENT)
Dept: CT IMAGING | Facility: CLINIC | Age: 74
End: 2024-03-06
Payer: MEDICARE

## 2024-03-06 ENCOUNTER — RESULT REVIEW (OUTPATIENT)
Age: 74
End: 2024-03-06

## 2024-03-06 PROCEDURE — 71250 CT THORAX DX C-: CPT

## 2024-05-17 ENCOUNTER — APPOINTMENT (OUTPATIENT)
Dept: MAMMOGRAPHY | Facility: CLINIC | Age: 74
End: 2024-05-17
Payer: MEDICARE

## 2024-05-17 PROCEDURE — 77067 SCR MAMMO BI INCL CAD: CPT

## 2024-05-17 PROCEDURE — 77063 BREAST TOMOSYNTHESIS BI: CPT

## 2024-09-09 ENCOUNTER — APPOINTMENT (OUTPATIENT)
Dept: CT IMAGING | Facility: CLINIC | Age: 74
End: 2024-09-09

## 2024-09-09 PROCEDURE — 71250 CT THORAX DX C-: CPT

## 2024-09-20 ENCOUNTER — OUTPATIENT (OUTPATIENT)
Dept: OUTPATIENT SERVICES | Facility: HOSPITAL | Age: 74
LOS: 1 days | End: 2024-09-20

## 2024-09-20 DIAGNOSIS — C50.919 MALIGNANT NEOPLASM OF UNSPECIFIED SITE OF UNSPECIFIED FEMALE BREAST: ICD-10-CM

## 2024-09-20 DIAGNOSIS — D50.9 IRON DEFICIENCY ANEMIA, UNSPECIFIED: ICD-10-CM

## 2024-09-23 ENCOUNTER — RESULT REVIEW (OUTPATIENT)
Age: 74
End: 2024-09-23

## 2024-09-23 ENCOUNTER — APPOINTMENT (OUTPATIENT)
Dept: HEMATOLOGY ONCOLOGY | Facility: CLINIC | Age: 74
End: 2024-09-23
Payer: MEDICARE

## 2024-09-23 VITALS
HEART RATE: 72 BPM | DIASTOLIC BLOOD PRESSURE: 86 MMHG | BODY MASS INDEX: 32.09 KG/M2 | SYSTOLIC BLOOD PRESSURE: 141 MMHG | HEIGHT: 65 IN | OXYGEN SATURATION: 97 % | WEIGHT: 192.6 LBS | TEMPERATURE: 97.4 F

## 2024-09-23 DIAGNOSIS — K86.9 DISEASE OF PANCREAS, UNSPECIFIED: ICD-10-CM

## 2024-09-23 DIAGNOSIS — Z85.3 PERSONAL HISTORY OF MALIGNANT NEOPLASM OF BREAST: ICD-10-CM

## 2024-09-23 LAB
BASOPHILS # BLD AUTO: 0.06 K/UL — SIGNIFICANT CHANGE UP (ref 0–0.2)
BASOPHILS NFR BLD AUTO: 0.8 % — SIGNIFICANT CHANGE UP (ref 0–2)
EOSINOPHIL # BLD AUTO: 0.22 K/UL — SIGNIFICANT CHANGE UP (ref 0–0.5)
EOSINOPHIL NFR BLD AUTO: 3.1 % — SIGNIFICANT CHANGE UP (ref 0–6)
HCT VFR BLD CALC: 38.6 % — SIGNIFICANT CHANGE UP (ref 34.5–45)
HGB BLD-MCNC: 12.8 G/DL — SIGNIFICANT CHANGE UP (ref 11.5–15.5)
IMM GRANULOCYTES NFR BLD AUTO: 0.4 % — SIGNIFICANT CHANGE UP (ref 0–0.9)
LYMPHOCYTES # BLD AUTO: 2.71 K/UL — SIGNIFICANT CHANGE UP (ref 1–3.3)
LYMPHOCYTES # BLD AUTO: 38.4 % — SIGNIFICANT CHANGE UP (ref 13–44)
MCHC RBC-ENTMCNC: 28.6 PG — SIGNIFICANT CHANGE UP (ref 27–34)
MCHC RBC-ENTMCNC: 33.2 GM/DL — SIGNIFICANT CHANGE UP (ref 32–36)
MCV RBC AUTO: 86.4 FL — SIGNIFICANT CHANGE UP (ref 80–100)
MONOCYTES # BLD AUTO: 0.37 K/UL — SIGNIFICANT CHANGE UP (ref 0–0.9)
MONOCYTES NFR BLD AUTO: 5.2 % — SIGNIFICANT CHANGE UP (ref 2–14)
NEUTROPHILS # BLD AUTO: 3.67 K/UL — SIGNIFICANT CHANGE UP (ref 1.8–7.4)
NEUTROPHILS NFR BLD AUTO: 52.1 % — SIGNIFICANT CHANGE UP (ref 43–77)
NRBC # BLD: 0 /100 WBCS — SIGNIFICANT CHANGE UP (ref 0–0)
PLATELET # BLD AUTO: 197 K/UL — SIGNIFICANT CHANGE UP (ref 150–400)
RBC # BLD: 4.47 M/UL — SIGNIFICANT CHANGE UP (ref 3.8–5.2)
RBC # FLD: 14.6 % — HIGH (ref 10.3–14.5)
WBC # BLD: 7.06 K/UL — SIGNIFICANT CHANGE UP (ref 3.8–10.5)
WBC # FLD AUTO: 7.06 K/UL — SIGNIFICANT CHANGE UP (ref 3.8–10.5)

## 2024-09-23 PROCEDURE — 99214 OFFICE O/P EST MOD 30 MIN: CPT

## 2024-09-23 PROCEDURE — 85027 COMPLETE CBC AUTOMATED: CPT

## 2024-09-23 PROCEDURE — G2211 COMPLEX E/M VISIT ADD ON: CPT

## 2024-09-23 RX ORDER — METOPROLOL TARTRATE 50 MG/1
50 TABLET ORAL
Refills: 0 | Status: ACTIVE | COMMUNITY

## 2024-09-23 RX ORDER — SEMAGLUTIDE 2.68 MG/ML
INJECTION, SOLUTION SUBCUTANEOUS
Refills: 0 | Status: ACTIVE | COMMUNITY

## 2024-09-23 RX ORDER — GLIMEPIRIDE 4 MG/1
4 TABLET ORAL
Refills: 0 | Status: ACTIVE | COMMUNITY

## 2024-09-23 NOTE — HISTORY OF PRESENT ILLNESS
[de-identified] : Referred by: Dr. Marissa Byrne Diagnosis: pulmonary nodule  Ms. Rios presented at age 71 in May 2022 for evaluation of pulmonary nodules.  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   Presenting HPI: Meagan was sent in for evaluation by her pulmonologist for evaluation of an opacity in the right lung.  She has a history of chronic shortness of breath with exertion. She underwent CT chest 12/2/21 which was negative for PE, revealed groundglass opacity in the R lung apex, as well as adjacent 4mm nodule. Cannot exclude neoplastic process. She then underwent repeat CT chest on 3/21/22 which revealed a 3 x 1.7cm indeterminate opacity within the R apex, scattered subCM nodules, possibly benign intrapulmonary lymph nodes- recommended 3 month follow up.  She is pending a PET/CT at the end of May which was ordered by Dr. Byrne.  Of note she has a history of right breast cancer diagnosed in 2003 and is status post right lumpectomy, radiation and chemotherapy.  She believes her tumor was hormone positive and she completed about 7 years of antiestrogen therapy which she states she took every other day due to issues with her bone density.  She was previously following with medical oncologist Dr. Asencio in [facility] but states she needs a new oncologist since she moved out here about 2 years ago during the pandemic.  She is a never smoker.  At this time she reports that she is generally feeling well and has a good appetite and energy level.  She denies chest pain, fevers, nausea, vomiting, diarrhea, lower extremity swelling.  Imaging:  CT chest 12/2/21- negative for PE, revealed groundglass opacity in the R lung apex, as well as adjacent 4mm nodule. Cannot exclude neoplastic process.  CT chest 3/21/22- 3 x 1.7cm indeterminate opacity within the R apex, scattered subCM nodules, possibly benign intrapulmonary lymph nodes- recommended 3 month follow up  Path: none  Genetics: none  HCM:  - COVID vaccination: s/p 3 doses  - Colonoscopy: last done 2014, next due 2024 - Gyn: recently seen by Dr. Doherty, no longer screening with pap  - Mammo: 4/22/22 BIRADS 2  - DEXA 4/22/22 Osteopenia, currently taking vitamin D   SH:  - Occupation: worked as an  at [employer] in [place] for 47 years - Living situation: lives in [place], with her daughter, grandson and nephew - Smoking/etoh/illicits: never smoker, rare etoh, denies illicits  - Exercise: she walks her dog but otherwise is not very active   FH:  - No family history of cancer  [de-identified] : Meagan presents for follow up on 9/23/24 for lung nodule, hx of breast cancer.   Meagan was last seen in June 2022. She reports she is generally feeling well.  MMG 5/2024 was normal.  She denies any breast related symptoms. She is on ozempic and states her weight fluctuates.  Denies recent headaches, visual changes, balance issues, CP, cough, SOB, n/v/d, constipation, unintentional weight loss or new bone or back pain. Here to discuss her most recent CT chest which showed a lesion in the pancreas which grew from 1.5cm to 2.1cm.   Ct chest 9/9/24  IMPRESSION: Stable patchy opacity in the apical segment of the right upper lobe when compared to previous exam. Exact etiology is unclear. One of the differential diagnostic consideration includes lung cancer. 2.1 cm low-attenuation lesion is noted in the tail of the pancreas. Further evaluation with CT scan of the abdomen with intravenous contrast is recommended.   CT chest 3/6/24 reviewed- IMPRESSION: Stable ill-defined opacity in the apical segment of the right upper lobe when compared to previous exam. Continued follow-up examination is recommended to ensure stability. 1.5cm low attenuation lesion in the tail of the pancreas- unchanged from prior exam (7/2023)  PET/CT 5/25/22 reviewed: indeterminate, irregular opacity in the RUL, favor inflammatory/infectious, recommend f/u CT in 3 months. Mild hepatomegaly with mild prominence of the umbilical vein, correlate for possible cirrhosis. Mild dilatation of ascending aorta, stable.   HCM:  - DEXA 4/22/22: osteopenia, taking calcium supplement  - MMG 5/17/24- BIRADS 2 - Last colonoscopy 2017, was told to repeat in 10 years  - Gyn: no longer screening with pap smear

## 2024-09-23 NOTE — RESULTS/DATA
[FreeTextEntry1] : Ms. Rios presented at age 71 in May 2022 for evaluation of pulmonary nodules.  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   History of breast cancer. On surveillance.  Clinically NALLELY.  MMG 5/2024 BIRADS 2.  Continue routine imaging.   Pancreatic lesion:  Pancreatic lesion noted on CT chest  She is asymptomatic. Lesion grew from 1.5cm to 2.1cm 2.1 cm low-attenuation lesion is noted in the tail of the pancreas.  Will obtain MRCP for further evaluation at this time.  Lung nodules are stable. Repeat CT chest 1 year.   I personally have spent a total of 35 minutes of time on the date of this encounter reviewing test results, documenting findings, coordinating care and directly consulting with the patient and/or designated family member. Greater than 50% of the face to face encounter time was spent on counseling and/or coordination of care for history of breast cancer, pancreatic lesion.

## 2024-09-23 NOTE — PHYSICAL EXAM
[Fully active, able to carry on all pre-disease performance without restriction] : Status 0 - Fully active, able to carry on all pre-disease performance without restriction [Obese] : obese [Normal] : affect appropriate [de-identified] : generally well appearing female, NAD, pleasant  [de-identified] : s/p bilateral breast reduction and R lumpectomy, post surgical changes, no palpable lymphadenopathy or masses

## 2024-09-23 NOTE — HISTORY OF PRESENT ILLNESS
[de-identified] : Referred by: Dr. Marissa Byrne Diagnosis: pulmonary nodule  Ms. Rios presented at age 71 in May 2022 for evaluation of pulmonary nodules.  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   Presenting HPI: Meagan was sent in for evaluation by her pulmonologist for evaluation of an opacity in the right lung.  She has a history of chronic shortness of breath with exertion. She underwent CT chest 12/2/21 which was negative for PE, revealed groundglass opacity in the R lung apex, as well as adjacent 4mm nodule. Cannot exclude neoplastic process. She then underwent repeat CT chest on 3/21/22 which revealed a 3 x 1.7cm indeterminate opacity within the R apex, scattered subCM nodules, possibly benign intrapulmonary lymph nodes- recommended 3 month follow up.  She is pending a PET/CT at the end of May which was ordered by Dr. Byrne.  Of note she has a history of right breast cancer diagnosed in 2003 and is status post right lumpectomy, radiation and chemotherapy.  She believes her tumor was hormone positive and she completed about 7 years of antiestrogen therapy which she states she took every other day due to issues with her bone density.  She was previously following with medical oncologist Dr. Asencio in [facility] but states she needs a new oncologist since she moved out here about 2 years ago during the pandemic.  She is a never smoker.  At this time she reports that she is generally feeling well and has a good appetite and energy level.  She denies chest pain, fevers, nausea, vomiting, diarrhea, lower extremity swelling.  Imaging:  CT chest 12/2/21- negative for PE, revealed groundglass opacity in the R lung apex, as well as adjacent 4mm nodule. Cannot exclude neoplastic process.  CT chest 3/21/22- 3 x 1.7cm indeterminate opacity within the R apex, scattered subCM nodules, possibly benign intrapulmonary lymph nodes- recommended 3 month follow up  Path: none  Genetics: none  HCM:  - COVID vaccination: s/p 3 doses  - Colonoscopy: last done 2014, next due 2024 - Gyn: recently seen by Dr. Doherty, no longer screening with pap  - Mammo: 4/22/22 BIRADS 2  - DEXA 4/22/22 Osteopenia, currently taking vitamin D   SH:  - Occupation: worked as an  at [employer] in [place] for 47 years - Living situation: lives in [place], with her daughter, grandson and nephew - Smoking/etoh/illicits: never smoker, rare etoh, denies illicits  - Exercise: she walks her dog but otherwise is not very active   FH:  - No family history of cancer  [de-identified] : Meagan presents for follow up on 9/23/24 for lung nodule, hx of breast cancer.   Meagan was last seen in June 2022. She reports she is generally feeling well.  MMG 5/2024 was normal.  She denies any breast related symptoms. She is on ozempic and states her weight fluctuates.  Denies recent headaches, visual changes, balance issues, CP, cough, SOB, n/v/d, constipation, unintentional weight loss or new bone or back pain. Here to discuss her most recent CT chest which showed a lesion in the pancreas which grew from 1.5cm to 2.1cm.   Ct chest 9/9/24  IMPRESSION: Stable patchy opacity in the apical segment of the right upper lobe when compared to previous exam. Exact etiology is unclear. One of the differential diagnostic consideration includes lung cancer. 2.1 cm low-attenuation lesion is noted in the tail of the pancreas. Further evaluation with CT scan of the abdomen with intravenous contrast is recommended.   CT chest 3/6/24 reviewed- IMPRESSION: Stable ill-defined opacity in the apical segment of the right upper lobe when compared to previous exam. Continued follow-up examination is recommended to ensure stability. 1.5cm low attenuation lesion in the tail of the pancreas- unchanged from prior exam (7/2023)  PET/CT 5/25/22 reviewed: indeterminate, irregular opacity in the RUL, favor inflammatory/infectious, recommend f/u CT in 3 months. Mild hepatomegaly with mild prominence of the umbilical vein, correlate for possible cirrhosis. Mild dilatation of ascending aorta, stable.   HCM:  - DEXA 4/22/22: osteopenia, taking calcium supplement  - MMG 5/17/24- BIRADS 2 - Last colonoscopy 2017, was told to repeat in 10 years  - Gyn: no longer screening with pap smear

## 2024-09-23 NOTE — PHYSICAL EXAM
[Fully active, able to carry on all pre-disease performance without restriction] : Status 0 - Fully active, able to carry on all pre-disease performance without restriction [Obese] : obese [Normal] : affect appropriate [de-identified] : generally well appearing female, NAD, pleasant  [de-identified] : s/p bilateral breast reduction and R lumpectomy, post surgical changes, no palpable lymphadenopathy or masses

## 2024-09-24 LAB
FERRITIN SERPL-MCNC: 80 NG/ML
FOLATE SERPL-MCNC: 7.4 NG/ML
IRON SATN MFR SERPL: 5 %
IRON SERPL-MCNC: 68 UG/DL
TIBC SERPL-MCNC: 1369 UG/DL
UIBC SERPL-MCNC: 1301 UG/DL
VIT B12 SERPL-MCNC: 561 PG/ML

## 2024-10-08 ENCOUNTER — APPOINTMENT (OUTPATIENT)
Dept: MRI IMAGING | Facility: CLINIC | Age: 74
End: 2024-10-08
Payer: MEDICARE

## 2024-10-08 PROCEDURE — 74183 MRI ABD W/O CNTR FLWD CNTR: CPT

## 2024-10-08 PROCEDURE — A9585: CPT

## 2025-03-21 ENCOUNTER — OUTPATIENT (OUTPATIENT)
Dept: OUTPATIENT SERVICES | Facility: HOSPITAL | Age: 75
LOS: 1 days | End: 2025-03-21

## 2025-03-21 DIAGNOSIS — D64.9 ANEMIA, UNSPECIFIED: ICD-10-CM

## 2025-03-24 ENCOUNTER — APPOINTMENT (OUTPATIENT)
Dept: HEMATOLOGY ONCOLOGY | Facility: CLINIC | Age: 75
End: 2025-03-24
Payer: MEDICARE

## 2025-03-24 VITALS
HEART RATE: 83 BPM | SYSTOLIC BLOOD PRESSURE: 130 MMHG | TEMPERATURE: 97.8 F | WEIGHT: 194 LBS | BODY MASS INDEX: 32.28 KG/M2 | DIASTOLIC BLOOD PRESSURE: 76 MMHG | OXYGEN SATURATION: 97 %

## 2025-03-24 DIAGNOSIS — K86.9 DISEASE OF PANCREAS, UNSPECIFIED: ICD-10-CM

## 2025-03-24 DIAGNOSIS — Z85.3 PERSONAL HISTORY OF MALIGNANT NEOPLASM OF BREAST: ICD-10-CM

## 2025-03-24 DIAGNOSIS — R91.1 SOLITARY PULMONARY NODULE: ICD-10-CM

## 2025-03-24 PROCEDURE — 99213 OFFICE O/P EST LOW 20 MIN: CPT

## 2025-03-24 PROCEDURE — G2211 COMPLEX E/M VISIT ADD ON: CPT

## 2025-03-31 ENCOUNTER — RESULT REVIEW (OUTPATIENT)
Age: 75
End: 2025-03-31

## 2025-04-01 ENCOUNTER — APPOINTMENT (OUTPATIENT)
Dept: RADIOLOGY | Facility: CLINIC | Age: 75
End: 2025-04-01
Payer: MEDICARE

## 2025-04-01 ENCOUNTER — APPOINTMENT (OUTPATIENT)
Dept: CT IMAGING | Facility: CLINIC | Age: 75
End: 2025-04-01

## 2025-04-01 PROCEDURE — 71260 CT THORAX DX C+: CPT

## 2025-04-01 PROCEDURE — 77080 DXA BONE DENSITY AXIAL: CPT

## 2025-04-02 ENCOUNTER — NON-APPOINTMENT (OUTPATIENT)
Age: 75
End: 2025-04-02

## 2025-04-04 ENCOUNTER — APPOINTMENT (OUTPATIENT)
Dept: MRI IMAGING | Facility: CLINIC | Age: 75
End: 2025-04-04
Payer: MEDICARE

## 2025-04-04 PROCEDURE — 74183 MRI ABD W/O CNTR FLWD CNTR: CPT

## 2025-04-04 PROCEDURE — A9585: CPT

## 2025-04-08 ENCOUNTER — NON-APPOINTMENT (OUTPATIENT)
Age: 75
End: 2025-04-08

## 2025-04-24 ENCOUNTER — APPOINTMENT (OUTPATIENT)
Dept: INTERNAL MEDICINE | Facility: CLINIC | Age: 75
End: 2025-04-24
Payer: MEDICARE

## 2025-04-24 ENCOUNTER — APPOINTMENT (OUTPATIENT)
Dept: PULMONOLOGY | Facility: CLINIC | Age: 75
End: 2025-04-24

## 2025-04-24 VITALS
DIASTOLIC BLOOD PRESSURE: 75 MMHG | RESPIRATION RATE: 16 BRPM | BODY MASS INDEX: 32.44 KG/M2 | TEMPERATURE: 98 F | WEIGHT: 190 LBS | HEIGHT: 64 IN | SYSTOLIC BLOOD PRESSURE: 120 MMHG | OXYGEN SATURATION: 97 % | HEART RATE: 68 BPM

## 2025-04-24 DIAGNOSIS — R91.1 SOLITARY PULMONARY NODULE: ICD-10-CM

## 2025-04-24 DIAGNOSIS — R06.02 SHORTNESS OF BREATH: ICD-10-CM

## 2025-04-24 PROCEDURE — 94060 EVALUATION OF WHEEZING: CPT

## 2025-04-24 PROCEDURE — ZZZZZ: CPT

## 2025-04-24 PROCEDURE — 94727 GAS DIL/WSHOT DETER LNG VOL: CPT

## 2025-04-24 PROCEDURE — 94729 DIFFUSING CAPACITY: CPT

## 2025-04-24 PROCEDURE — 99204 OFFICE O/P NEW MOD 45 MIN: CPT | Mod: 25

## 2025-04-24 RX ORDER — FLUTICASONE PROPIONATE AND SALMETEROL 250; 50 UG/1; UG/1
250-50 POWDER RESPIRATORY (INHALATION)
Refills: 0 | Status: ACTIVE | COMMUNITY

## 2025-04-24 RX ORDER — EZETIMIBE 10 MG/1
10 TABLET ORAL
Refills: 0 | Status: ACTIVE | COMMUNITY

## 2025-04-24 RX ORDER — ALBUTEROL SULFATE 90 UG/1
108 (90 BASE) INHALANT RESPIRATORY (INHALATION)
Refills: 0 | Status: ACTIVE | COMMUNITY

## 2025-04-25 ENCOUNTER — NON-APPOINTMENT (OUTPATIENT)
Age: 75
End: 2025-04-25

## 2025-04-29 PROBLEM — R91.1 PULMONARY LESION: Status: ACTIVE | Noted: 2025-04-29

## 2025-04-29 PROBLEM — R06.02 SHORTNESS OF BREATH: Status: ACTIVE | Noted: 2025-04-29

## 2025-05-19 ENCOUNTER — RESULT REVIEW (OUTPATIENT)
Age: 75
End: 2025-05-19

## 2025-05-19 ENCOUNTER — APPOINTMENT (OUTPATIENT)
Dept: ULTRASOUND IMAGING | Facility: CLINIC | Age: 75
End: 2025-05-19

## 2025-05-19 ENCOUNTER — APPOINTMENT (OUTPATIENT)
Dept: MAMMOGRAPHY | Facility: CLINIC | Age: 75
End: 2025-05-19
Payer: MEDICARE

## 2025-05-19 PROCEDURE — 76641 ULTRASOUND BREAST COMPLETE: CPT | Mod: 50

## 2025-05-19 PROCEDURE — 77063 BREAST TOMOSYNTHESIS BI: CPT

## 2025-05-19 PROCEDURE — 77067 SCR MAMMO BI INCL CAD: CPT

## 2025-07-15 ENCOUNTER — OUTPATIENT (OUTPATIENT)
Dept: OUTPATIENT SERVICES | Facility: HOSPITAL | Age: 75
LOS: 1 days | End: 2025-07-15

## 2025-07-15 DIAGNOSIS — D64.9 ANEMIA, UNSPECIFIED: ICD-10-CM

## 2025-07-22 ENCOUNTER — NON-APPOINTMENT (OUTPATIENT)
Age: 75
End: 2025-07-22

## 2025-07-24 ENCOUNTER — APPOINTMENT (OUTPATIENT)
Dept: HEMATOLOGY ONCOLOGY | Facility: CLINIC | Age: 75
End: 2025-07-24
Payer: MEDICARE

## 2025-07-24 VITALS
WEIGHT: 183 LBS | DIASTOLIC BLOOD PRESSURE: 56 MMHG | HEART RATE: 73 BPM | OXYGEN SATURATION: 97 % | BODY MASS INDEX: 31.24 KG/M2 | HEIGHT: 64 IN | TEMPERATURE: 97.6 F | SYSTOLIC BLOOD PRESSURE: 124 MMHG

## 2025-07-24 DIAGNOSIS — R91.1 SOLITARY PULMONARY NODULE: ICD-10-CM

## 2025-07-24 DIAGNOSIS — K86.9 DISEASE OF PANCREAS, UNSPECIFIED: ICD-10-CM

## 2025-07-24 PROCEDURE — 99214 OFFICE O/P EST MOD 30 MIN: CPT

## 2025-09-11 ENCOUNTER — APPOINTMENT (OUTPATIENT)
Dept: CARDIOLOGY | Facility: CLINIC | Age: 75
End: 2025-09-11
Payer: MEDICARE

## 2025-09-11 DIAGNOSIS — K86.9 DISEASE OF PANCREAS, UNSPECIFIED: ICD-10-CM

## 2025-09-11 DIAGNOSIS — R94.31 ABNORMAL ELECTROCARDIOGRAM [ECG] [EKG]: ICD-10-CM

## 2025-09-11 DIAGNOSIS — R91.1 SOLITARY PULMONARY NODULE: ICD-10-CM

## 2025-09-11 DIAGNOSIS — Z82.49 FAMILY HISTORY OF ISCHEMIC HEART DISEASE AND OTHER DISEASES OF THE CIRCULATORY SYSTEM: ICD-10-CM

## 2025-09-11 DIAGNOSIS — R06.09 OTHER FORMS OF DYSPNEA: ICD-10-CM

## 2025-09-11 DIAGNOSIS — I49.3 VENTRICULAR PREMATURE DEPOLARIZATION: ICD-10-CM

## 2025-09-11 DIAGNOSIS — Z77.22 CONTACT WITH AND (SUSPECTED) EXPOSURE TO ENVIRONMENTAL TOBACCO SMOKE (ACUTE) (CHRONIC): ICD-10-CM

## 2025-09-11 DIAGNOSIS — D50.9 IRON DEFICIENCY ANEMIA, UNSPECIFIED: ICD-10-CM

## 2025-09-11 DIAGNOSIS — Z82.5 FAMILY HISTORY OF ASTHMA AND OTHER CHRONIC LOWER RESPIRATORY DISEASES: ICD-10-CM

## 2025-09-11 PROCEDURE — 99204 OFFICE O/P NEW MOD 45 MIN: CPT

## 2025-09-11 RX ORDER — GLIMEPIRIDE 1 MG/1
1 TABLET ORAL DAILY
Refills: 0 | Status: ACTIVE | COMMUNITY

## 2025-09-11 RX ORDER — METOPROLOL TARTRATE 50 MG/1
50 TABLET ORAL DAILY
Refills: 0 | Status: ACTIVE | COMMUNITY

## 2025-09-11 RX ORDER — MULTIVIT-MIN/IRON/FOLIC ACID/K 18-600-40
50 MCG CAPSULE ORAL
Refills: 0 | Status: ACTIVE | COMMUNITY